# Patient Record
Sex: MALE | Race: BLACK OR AFRICAN AMERICAN | NOT HISPANIC OR LATINO | URBAN - METROPOLITAN AREA
[De-identification: names, ages, dates, MRNs, and addresses within clinical notes are randomized per-mention and may not be internally consistent; named-entity substitution may affect disease eponyms.]

---
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
## 2024-08-02 NOTE — PATIENT INSTRUCTIONS
Problem: Safety - Adult  Goal: Free from fall injury  Outcome: Progressing     Problem: Discharge Planning  Goal: Discharge to home or other facility with appropriate resources  Outcome: Progressing     Problem: ABCDS Injury Assessment  Goal: Absence of physical injury  Outcome: Progressing     Problem: Pain  Goal: Verbalizes/displays adequate comfort level or baseline comfort level  Outcome: Progressing     Problem: Musculoskeletal - Adult  Goal: Return mobility to safest level of function  Outcome: Progressing  Goal: Maintain proper alignment of affected body part  Outcome: Progressing  Goal: Return ADL status to a safe level of function  Outcome: Progressing     Problem: Nutrition Deficit:  Goal: Optimize nutritional status  Outcome: Progressing        Step-by-Step  Using an Inhaler with a Spacer    Date Last Reviewed: 2/1/2017  © 6220-6932 [employer]. [address] [place] [address], [place] [address]. All rights reserved. This information is not intended as a substitute for professional medical care. Always follow your healthcare professional's instructions.        Bronchitis with Wheezing (Child)    Bronchitis is inflammation and swelling of the lining of the lungs. This is often caused by an infection. Symptoms include a dry, hacking cough that is worse at night. The cough may bring up yellow-green mucus. Your child may also breathe fast or seem short of breath. He or she may have a fever. Other symptoms may include tiredness, chest discomfort, and chills. Inflammation may limit how much air can flow through the airways. This can cause wheezing and trouble breathing, even in children who dont have asthma. Wheezing is a whistling sound caused by breathing through narrowed airways.  Bronchitis is most often caused by a virus of the upper respiratory tract. Symptoms can last up to 2 weeks, although the cough may last much longer. Medicines may be given to help relieve symptoms, including wheezing. Antibiotics will be prescribed only if your childs health care provider thinks your child has a bacterial infection. Antibiotics do not cure a viral infection.  Home care  Follow these guidelines when caring for your child at home:  · Your childs health care provider may prescribe medicine for cough, pain, or fever. You may be told to use saltwater (saline) nose drops to help with breathing. Use these before your child eats or sleeps. Your child may be prescribed bronchodilator medicine. This is to help with breathing. It may come as a spray, inhaler, or pill to take by mouth. Make sure your child uses the medicine exactly at the times advised. Follow all instructions for giving these medicines to your child.  · The provider may also prescribe an oral  antibiotic for your child. This is to help stop an infection. Follow all instructions for giving this medicine to your child. Make sure your child takes the medicine every day until it is gone. You should not have any left over.  · You may use over-the-counter medication as directed based on age and weight for fever or discomfort. (Note: If your child has chronic liver or kidney disease or has ever had a stomach ulcer or gastrointestinal bleeding, talk with your healthcare provider before using these medicines.) Aspirin should never be given to anyone younger than 18 years of age who is ill with a viral infection or fever. It may cause severe liver or brain damage. Dont give your child any other medicine without first asking the healthcare provider.  · Dont give a child under age 6 cough or cold medicine unless the provider tells you to do so. These have been shown to not help young children, and may cause serious side effects.  · Wash your hands well with soap and warm water before and after caring for your child. This is to help prevent spreading infection.  · Give your child plenty of time to rest. Trouble sleeping is common with this illness. Have your child sleep in a slightly upright position. This is to help make breathing easier. If possible, raise the head of the bed a few inches. Or prop your childs body up with pillows.  · Make sure your older child blows his or her nose effectively. Your childs healthcare provider may recommend saline nose drops to help thin and remove nasal secretions. Saline nose drops are available without a prescription. You can also use 1/4 teaspoon of table salt mixed well in 1 cup of water. You may put 2 to 3 drops of saline drops in each nostril before having your child blow his or her nose. Always wash your hands after touching used tissues.  · For younger children, suction mucus from the nose with saline nose drops and a small bulb syringe. Talk with your childs healthcare  provider or pharmacist if you dont know how to use a bulb syringe. Always wash your hands after using a bulb syringe or touching used tissues.  · To prevent dehydration and help loosen lung secretions in toddlers and older children, make sure your child drinks plenty of liquids. Children may prefer cold drinks, frozen desserts, or ice pops. They may also like warm soup or drinks with lemon and honey. Dont give honey to a child younger than 1 year old.  · To prevent dehydration and help loosen lung secretions in infants under 1 year old, make sure your child drinks plenty of liquids. Use a medicine dropper, if needed, to give small amounts of breast milk, formula, or oral rehydration solution to your baby. Give 1 to 2 teaspoons every 10 to 15 minutes. A baby may only be able to feed for short amounts of time. If you are breastfeeding, pump and store milk for later use. Give your child oral rehydration solution between feedings. This is available from grocery stores and drugstores without a prescription.  · To make breathing easier during sleep, use a cool-mist humidifier in your childs bedroom. Clean and dry the humidifier daily to prevent bacteria and mold growth. Dont use a hot-water vaporizer. It can cause burns. Your child may also feel more comfortable sitting in a steamy bathroom for up to 10 minutes.  · Dont expose your child to cigarette smoke. Tobacco smoke can make your childs symptoms worse.  Follow-up care  Follow up with your childs health care provider, or as advised.  When to seek medical advice  For a usually healthy child, call your child's healthcare provider right away if any of these occur:  · Your child is 3 months old or younger and has a fever of 100.4°F (38°C) or higher. Get medical care right away. Fever in a young baby can be a sign of a dangerous infection.  · Your child is of any age and has repeated fevers above 104°F (40°C).  · Your child is younger than 2 years of age and a fever  of 100.4°F (38°C) continues for more than 1 day.  · Your child is 2 years old or older and a fever of 100.4°F (38°C) continues for more than 3 days.  · Symptoms dont get better in 1 to 2 weeks, or get worse.  · Breathing difficulty doesnt get better in several days.  · Your child loses his or her appetite or feeds poorly.  · Your child shows signs of dehydration, such as dry mouth, crying with no tears, or urinating less than normal.  · The medicine doesnt relieve wheezing.  Call 911, or get immediate medical care  Contact emergency services if any of these occur:  · Increasing trouble breathing or increasing wheezing  · Extreme drowsiness or trouble awakening  · Confusion  · Fainting or loss of consciousness  Date Last Reviewed: 9/13/2015  © 4717-5558 [employer]. [address] [place] [address], [place] [address]. All rights reserved. This information is not intended as a substitute for professional medical care. Always follow your healthcare professional's instructions.